# Patient Record
Sex: FEMALE | Race: WHITE | NOT HISPANIC OR LATINO
[De-identification: names, ages, dates, MRNs, and addresses within clinical notes are randomized per-mention and may not be internally consistent; named-entity substitution may affect disease eponyms.]

---

## 2022-02-22 ENCOUNTER — APPOINTMENT (OUTPATIENT)
Dept: OBGYN | Facility: CLINIC | Age: 27
End: 2022-02-22

## 2023-08-02 ENCOUNTER — NON-APPOINTMENT (OUTPATIENT)
Age: 28
End: 2023-08-02

## 2024-05-28 PROBLEM — Z00.00 ENCOUNTER FOR PREVENTIVE HEALTH EXAMINATION: Status: ACTIVE | Noted: 2024-05-28

## 2024-07-10 ENCOUNTER — INPATIENT (INPATIENT)
Facility: HOSPITAL | Age: 29
LOS: 1 days | Discharge: HOME | DRG: 951 | End: 2024-07-12
Attending: STUDENT IN AN ORGANIZED HEALTH CARE EDUCATION/TRAINING PROGRAM | Admitting: STUDENT IN AN ORGANIZED HEALTH CARE EDUCATION/TRAINING PROGRAM
Payer: COMMERCIAL

## 2024-07-10 VITALS — HEART RATE: 93 BPM | SYSTOLIC BLOOD PRESSURE: 130 MMHG | OXYGEN SATURATION: 99 % | DIASTOLIC BLOOD PRESSURE: 77 MMHG

## 2024-07-10 DIAGNOSIS — Z34.80 ENCOUNTER FOR SUPERVISION OF OTHER NORMAL PREGNANCY, UNSPECIFIED TRIMESTER: ICD-10-CM

## 2024-07-10 DIAGNOSIS — O26.899 OTHER SPECIFIED PREGNANCY RELATED CONDITIONS, UNSPECIFIED TRIMESTER: ICD-10-CM

## 2024-07-10 LAB
BASOPHILS # BLD AUTO: 0.03 K/UL — SIGNIFICANT CHANGE UP (ref 0–0.2)
BASOPHILS NFR BLD AUTO: 0.2 % — SIGNIFICANT CHANGE UP (ref 0–2)
EOSINOPHIL # BLD AUTO: 0.05 K/UL — SIGNIFICANT CHANGE UP (ref 0–0.5)
EOSINOPHIL NFR BLD AUTO: 0.4 % — SIGNIFICANT CHANGE UP (ref 0–6)
HCT VFR BLD CALC: 38.1 % — SIGNIFICANT CHANGE UP (ref 34.5–45)
HGB BLD-MCNC: 12.9 G/DL — SIGNIFICANT CHANGE UP (ref 11.5–15.5)
IMM GRANULOCYTES NFR BLD AUTO: 0.6 % — SIGNIFICANT CHANGE UP (ref 0–0.9)
LYMPHOCYTES # BLD AUTO: 1.25 K/UL — SIGNIFICANT CHANGE UP (ref 1–3.3)
LYMPHOCYTES # BLD AUTO: 10.4 % — LOW (ref 13–44)
MCHC RBC-ENTMCNC: 27.7 PG — SIGNIFICANT CHANGE UP (ref 27–34)
MCHC RBC-ENTMCNC: 33.9 GM/DL — SIGNIFICANT CHANGE UP (ref 32–36)
MCV RBC AUTO: 81.8 FL — SIGNIFICANT CHANGE UP (ref 80–100)
MONOCYTES # BLD AUTO: 0.99 K/UL — HIGH (ref 0–0.9)
MONOCYTES NFR BLD AUTO: 8.2 % — SIGNIFICANT CHANGE UP (ref 2–14)
NEUTROPHILS # BLD AUTO: 9.65 K/UL — HIGH (ref 1.8–7.4)
NEUTROPHILS NFR BLD AUTO: 80.2 % — HIGH (ref 43–77)
NRBC # BLD: 0 /100 WBCS — SIGNIFICANT CHANGE UP (ref 0–0)
PLATELET # BLD AUTO: 215 K/UL — SIGNIFICANT CHANGE UP (ref 150–400)
RBC # BLD: 4.66 M/UL — SIGNIFICANT CHANGE UP (ref 3.8–5.2)
RBC # FLD: 13.8 % — SIGNIFICANT CHANGE UP (ref 10.3–14.5)
WBC # BLD: 12.04 K/UL — HIGH (ref 3.8–10.5)
WBC # FLD AUTO: 12.04 K/UL — HIGH (ref 3.8–10.5)

## 2024-07-10 RX ORDER — TETANUS TOXOID, REDUCED DIPHTHERIA TOXOID AND ACELLULAR PERTUSSIS VACCINE, ADSORBED 5; 2.5; 8; 8; 2.5 [IU]/.5ML; [IU]/.5ML; UG/.5ML; UG/.5ML; UG/.5ML
0.5 SUSPENSION INTRAMUSCULAR ONCE
Refills: 0 | Status: DISCONTINUED | OUTPATIENT
Start: 2024-07-10 | End: 2024-07-12

## 2024-07-10 RX ORDER — DIBUCAINE 1 %
1 OINTMENT (GRAM) TOPICAL EVERY 6 HOURS
Refills: 0 | Status: DISCONTINUED | OUTPATIENT
Start: 2024-07-10 | End: 2024-07-12

## 2024-07-10 RX ORDER — OXYCODONE HYDROCHLORIDE 100 MG/5ML
5 SOLUTION ORAL
Refills: 0 | Status: DISCONTINUED | OUTPATIENT
Start: 2024-07-10 | End: 2024-07-12

## 2024-07-10 RX ORDER — OXYCODONE HYDROCHLORIDE 100 MG/5ML
5 SOLUTION ORAL ONCE
Refills: 0 | Status: DISCONTINUED | OUTPATIENT
Start: 2024-07-10 | End: 2024-07-12

## 2024-07-10 RX ORDER — PRENATAL VIT/IRON FUM/FOLIC AC 60 MG-1 MG
1 TABLET ORAL DAILY
Refills: 0 | Status: DISCONTINUED | OUTPATIENT
Start: 2024-07-10 | End: 2024-07-12

## 2024-07-10 RX ORDER — TRISODIUM CITRATE DIHYDRATE AND CITRIC ACID MONOHYDRATE 500; 334 MG/5ML; MG/5ML
15 SOLUTION ORAL EVERY 6 HOURS
Refills: 0 | Status: DISCONTINUED | OUTPATIENT
Start: 2024-07-10 | End: 2024-07-10

## 2024-07-10 RX ORDER — HYDROCORTISONE VALERATE 0.2 %
1 CREAM (GRAM) TOPICAL EVERY 6 HOURS
Refills: 0 | Status: DISCONTINUED | OUTPATIENT
Start: 2024-07-10 | End: 2024-07-12

## 2024-07-10 RX ORDER — OXYTOCIN 30 [USP'U]/500ML
4 INJECTION, SOLUTION INTRAVENOUS
Qty: 30 | Refills: 0 | Status: DISCONTINUED | OUTPATIENT
Start: 2024-07-10 | End: 2024-07-12

## 2024-07-10 RX ORDER — PRAMOXINE HCL 1 %
1 CREAM (GRAM) RECTAL EVERY 4 HOURS
Refills: 0 | Status: DISCONTINUED | OUTPATIENT
Start: 2024-07-10 | End: 2024-07-12

## 2024-07-10 RX ORDER — DEXTROSE MONOHYDRATE AND SODIUM CHLORIDE 5; .3 G/100ML; G/100ML
1000 INJECTION, SOLUTION INTRAVENOUS
Refills: 0 | Status: DISCONTINUED | OUTPATIENT
Start: 2024-07-10 | End: 2024-07-10

## 2024-07-10 RX ORDER — DIPHENHYDRAMINE HCL 12.5MG/5ML
25 ELIXIR ORAL EVERY 6 HOURS
Refills: 0 | Status: DISCONTINUED | OUTPATIENT
Start: 2024-07-10 | End: 2024-07-12

## 2024-07-10 RX ORDER — BENZOCAINE 15 %
1 LIQUID (ML) TOPICAL EVERY 6 HOURS
Refills: 0 | Status: DISCONTINUED | OUTPATIENT
Start: 2024-07-10 | End: 2024-07-12

## 2024-07-10 RX ORDER — SODIUM CHLORIDE 0.9 % (FLUSH) 0.9 %
3 SYRINGE (ML) INJECTION EVERY 8 HOURS
Refills: 0 | Status: DISCONTINUED | OUTPATIENT
Start: 2024-07-10 | End: 2024-07-12

## 2024-07-10 RX ORDER — KETOROLAC TROMETHAMINE 30 MG/ML
30 INJECTION, SOLUTION INTRAMUSCULAR ONCE
Refills: 0 | Status: DISCONTINUED | OUTPATIENT
Start: 2024-07-10 | End: 2024-07-10

## 2024-07-10 RX ORDER — OXYTOCIN 30 [USP'U]/500ML
41.67 INJECTION, SOLUTION INTRAVENOUS
Qty: 20 | Refills: 0 | Status: DISCONTINUED | OUTPATIENT
Start: 2024-07-10 | End: 2024-07-12

## 2024-07-10 RX ORDER — SIMETHICONE 40MG/0.6ML
80 SUSPENSION, DROPS(FINAL DOSAGE FORM)(ML) ORAL EVERY 4 HOURS
Refills: 0 | Status: DISCONTINUED | OUTPATIENT
Start: 2024-07-10 | End: 2024-07-12

## 2024-07-10 RX ORDER — HYDROCORTISONE ACETATE 1 %
1 OINTMENT (GRAM) RECTAL EVERY 4 HOURS
Refills: 0 | Status: DISCONTINUED | OUTPATIENT
Start: 2024-07-10 | End: 2024-07-12

## 2024-07-10 RX ORDER — ACETAMINOPHEN 325 MG
975 TABLET ORAL
Refills: 0 | Status: DISCONTINUED | OUTPATIENT
Start: 2024-07-10 | End: 2024-07-12

## 2024-07-10 RX ORDER — LANOLIN
1 WAX (GRAM) MISCELLANEOUS EVERY 6 HOURS
Refills: 0 | Status: DISCONTINUED | OUTPATIENT
Start: 2024-07-10 | End: 2024-07-12

## 2024-07-10 RX ORDER — OXYTOCIN 30 [USP'U]/500ML
333.33 INJECTION, SOLUTION INTRAVENOUS
Qty: 20 | Refills: 0 | Status: COMPLETED | OUTPATIENT
Start: 2024-07-10 | End: 2024-07-10

## 2024-07-10 RX ADMIN — Medication 3 MILLILITER(S): at 23:03

## 2024-07-10 RX ADMIN — KETOROLAC TROMETHAMINE 30 MILLIGRAM(S): 30 INJECTION, SOLUTION INTRAMUSCULAR at 19:30

## 2024-07-10 RX ADMIN — DEXTROSE MONOHYDRATE AND SODIUM CHLORIDE 125 MILLILITER(S): 5; .3 INJECTION, SOLUTION INTRAVENOUS at 14:33

## 2024-07-10 RX ADMIN — OXYTOCIN 1000 MILLIUNIT(S)/MIN: 30 INJECTION, SOLUTION INTRAVENOUS at 17:50

## 2024-07-10 RX ADMIN — OXYTOCIN 4 MILLIUNIT(S)/MIN: 30 INJECTION, SOLUTION INTRAVENOUS at 14:33

## 2024-07-10 NOTE — OB PROVIDER H&P - HISTORY OF PRESENT ILLNESS
PA Note:  29y  @40wks and 4 days gestation presenting from office where she was found to be 4cm dilated and have low-normal fluid. She reports irregular ctx's. She denies LOF or VB. PNC uncomplicated. +FM. GBS -. EFW 7#9 done by ultrasound today.    POBHx: Denies  PGYNHx: Denies fibroids, ovarian cysts, abnormal pap smears, STD's  PMHx: "silent heart murmur" per pt-doesn't follow with cards  Medications: PNV  Allergies: NKDA  PSHx: Denies  Social Hx: Denies etoh/tobacco/drug use. Denies anxiety/depression    Vital Signs Last 24 Hrs  T(C): 37.3 (10 Jul 2024 12:08), Max: 37.3 (10 Jul 2024 12:08)  T(F): 99.1 (10 Jul 2024 12:08), Max: 99.1 (10 Jul 2024 12:08)  HR: 85 (10 Jul 2024 12:37) (85 - 100)  BP: 136/83 (10 Jul 2024 12:15) (130/77 - 136/83)  BP(mean): --  RR: 18 (10 Jul 2024 12:08) (18 - 18)  SpO2: 97% (10 Jul 2024 12:37) (89% - 99%)    Parameters below as of 10 Jul 2024 12:08  Patient On (Oxygen Delivery Method): room air    General: NAD, A&Ox3  CV: RRR  Lungs: CTA b/l  Abdomen: Soft, NT, gravid    VE: 5/80/-3  EFM: 140/moderate variability/+accels/no decels  [place]: q4-6m

## 2024-07-10 NOTE — OB PROVIDER DELIVERY SUMMARY - NSPRESENTATIONA_OBGYN_ALL_OB
Problem: Infection  Goal: Will remain free from infection    Intervention: Implement standard precautions and perform hand washing before and after patient contact  Performed hand hygiene appropriately throughout shift, education provided       Problem: Pain  Goal: Alleviation of Pain or a reduction in pain to the patient's comfort goal    Intervention: Pain Management - Epidural/Spinal  Pt currently resting comfortably with epidural          Cephalic

## 2024-07-10 NOTE — OB PROVIDER DELIVERY SUMMARY - NSPROVIDERDELIVERYNOTE_OBGYN_ALL_OB_FT
liveborn female infant in OA position  Compound presentation w/R arm  Placenta delivered spontaneously and intact  Small L sulcal, first degree, b/l periurethral repaired with 2.0 and 3.0 vicryl rapide suture  Count correct x 2  EBL 350ml    MD Yovanny

## 2024-07-10 NOTE — OB PROVIDER H&P - NSLOWPPHRISK_OBGYN_A_OB
No previous uterine incision/[place] Pregnancy/Less than or equal to 4 previous vaginal births/No known bleeding disorder/No history of postpartum hemorrhage/No other PPH risks indicated

## 2024-07-10 NOTE — OB RN DELIVERY SUMMARY - NSSELHIDDEN_OBGYN_ALL_OB_FT
[NS_DeliveryAttending1_OBGYN_ALL_OB_FT:[id number]=],[NS_DeliveryRN_OBGYN_ALL_OB_FT:[id number]==]

## 2024-07-10 NOTE — OB PROVIDER H&P - NSHPPHYSICALEXAM_GEN_ALL_CORE
Vital Signs Last 24 Hrs  T(C): 37.3 (10 Jul 2024 12:08), Max: 37.3 (10 Jul 2024 12:08)  T(F): 99.1 (10 Jul 2024 12:08), Max: 99.1 (10 Jul 2024 12:08)  HR: 85 (10 Jul 2024 12:37) (85 - 100)  BP: 136/83 (10 Jul 2024 12:15) (130/77 - 136/83)  BP(mean): --  RR: 18 (10 Jul 2024 12:08) (18 - 18)  SpO2: 97% (10 Jul 2024 12:37) (89% - 99%)    Parameters below as of 10 Jul 2024 12:08  Patient On (Oxygen Delivery Method): room air    General: NAD, A&Ox3  CV: RRR  Lungs: CTA b/l  Abdomen: Soft, NT, gravid

## 2024-07-10 NOTE — OB RN DELIVERY SUMMARY - NS_SEPSISRSKCALC_OBGYN_ALL_OB_FT
EOS calculated successfully. EOS Risk Factor: 0.5/1000 live births ([facility] national incidence); GA=40w4d; Temp=99.1; ROM=4.467; GBS='Negative'; Antibiotics='No antibiotics or any antibiotics < 2 hrs prior to birth'

## 2024-07-10 NOTE — OB PROVIDER DELIVERY SUMMARY - NSSELHIDDEN_OBGYN_ALL_OB_FT
[NS_DeliveryAttending1_OBGYN_ALL_OB_FT:[id number]=],[NS_DeliveryRN_OBGYN_ALL_OB_FT:[id number]==]

## 2024-07-10 NOTE — OB PROVIDER H&P - ASSESSMENT
A/P:  29y  @40wks and 4 days gestation presenting from office in early labor with low/normal fluid. +FM. GBS -. EFW 3400g  -Admit to L&D  -Routine labs  -EFM/[place]  -NPO, IVF, Bicitra  -Augment with pitocin  -Anesthesia consult  -Anticipate   D/w Dr. Adrian Granger PA-C

## 2024-07-10 NOTE — CHART NOTE - NSCHARTNOTEFT_GEN_A_CORE
Ob attending note    Pt examined at bedside. AROM performed - clear. 6/80/-2. Cat 1 tracing. for epi/pit.     MD Yovanny
Ob attending note    Pt examined for cervical change. Noted early decels. 9.5/90/-2. c/w pit for augmentation, high fowlers, resuscitate prn with repositioning, IVF. Plan to start pushing at fully dilated.    MD Yovanny

## 2024-07-11 LAB — T PALLIDUM AB TITR SER: NEGATIVE — SIGNIFICANT CHANGE UP

## 2024-07-11 RX ADMIN — Medication 975 MILLIGRAM(S): at 12:45

## 2024-07-11 RX ADMIN — Medication 600 MILLIGRAM(S): at 20:45

## 2024-07-11 RX ADMIN — Medication 600 MILLIGRAM(S): at 21:30

## 2024-07-11 RX ADMIN — Medication 600 MILLIGRAM(S): at 16:20

## 2024-07-11 RX ADMIN — Medication 600 MILLIGRAM(S): at 09:50

## 2024-07-11 RX ADMIN — Medication 600 MILLIGRAM(S): at 15:28

## 2024-07-11 RX ADMIN — Medication 975 MILLIGRAM(S): at 23:23

## 2024-07-11 RX ADMIN — Medication 600 MILLIGRAM(S): at 03:30

## 2024-07-11 RX ADMIN — Medication 975 MILLIGRAM(S): at 17:42

## 2024-07-11 RX ADMIN — Medication 975 MILLIGRAM(S): at 18:33

## 2024-07-11 RX ADMIN — Medication 975 MILLIGRAM(S): at 00:52

## 2024-07-11 RX ADMIN — Medication 975 MILLIGRAM(S): at 06:45

## 2024-07-11 RX ADMIN — Medication 600 MILLIGRAM(S): at 09:03

## 2024-07-11 RX ADMIN — Medication 975 MILLIGRAM(S): at 05:46

## 2024-07-11 RX ADMIN — Medication 975 MILLIGRAM(S): at 00:05

## 2024-07-11 RX ADMIN — Medication 1 TABLET(S): at 11:55

## 2024-07-11 RX ADMIN — Medication 975 MILLIGRAM(S): at 11:55

## 2024-07-11 RX ADMIN — Medication 600 MILLIGRAM(S): at 02:34

## 2024-07-11 NOTE — PROGRESS NOTE ADULT - SUBJECTIVE AND OBJECTIVE BOX
OB PA Progress Note:  PPD#1    29yoF P1 PPD#1 s/p . Patient feels well. Pain is well controlled. She is tolerating a regular diet and passing flatus. She is voiding freely and ambulating well without difficulty. Denies CP/SOB. Denies lightheadedness/dizziness. Denies N/V.    Vital Signs Last 24 Hrs  T(C): 36.6 (2024 05:54), Max: 37.3 (10 Jul 2024 12:08)  T(F): 97.8 (2024 05:54), Max: 99.1 (10 Jul 2024 12:08)  HR: 71 (2024 05:54) (66 - 110)  BP: 129/76 (2024 05:54) (110/65 - 187/73)  RR: 18 (2024 05:54) (18 - 20)  SpO2: 96% (2024 05:54) (71% - 100%)    MEDICATIONS  (STANDING):  acetaminophen     Tablet .. 975 milliGRAM(s) Oral <User Schedule>  diphtheria/tetanus/pertussis (acellular) Vaccine (Adacel) 0.5 milliLiter(s) IntraMuscular once  ibuprofen  Tablet. 600 milliGRAM(s) Oral every 6 hours  oxytocin Infusion 41.667 milliUNIT(s)/Min (125 mL/Hr) IV Continuous <Continuous>  oxytocin Infusion. 4 milliUNIT(s)/Min (4 mL/Hr) IV Continuous <Continuous>  prenatal multivitamin 1 Tablet(s) Oral daily  sodium chloride 0.9% lock flush 3 milliLiter(s) IV Push every 8 hours    Labs:  Blood type: A Positive  Rubella IgG: Immune   RPR: Negative                       12.9   12.04<H> >-----------< 215    ( 0710 @ 13:22 )             38.1    General: NAD  Abdomen: soft, non-tender, non-distended, fundus firm and below the umbillicus   Vaginal: Lochia wnl  Extremities: No erythema/edema. No calf tenderness

## 2024-07-12 ENCOUNTER — TRANSCRIPTION ENCOUNTER (OUTPATIENT)
Age: 29
End: 2024-07-12

## 2024-07-12 VITALS
HEART RATE: 66 BPM | RESPIRATION RATE: 18 BRPM | OXYGEN SATURATION: 99 % | DIASTOLIC BLOOD PRESSURE: 74 MMHG | TEMPERATURE: 98 F | SYSTOLIC BLOOD PRESSURE: 116 MMHG

## 2024-07-12 RX ORDER — ACETAMINOPHEN 325 MG
3 TABLET ORAL
Qty: 0 | Refills: 0 | DISCHARGE
Start: 2024-07-12

## 2024-07-12 RX ORDER — PRENATAL VIT/IRON FUM/FOLIC AC 60 MG-1 MG
0 TABLET ORAL
Refills: 0 | DISCHARGE

## 2024-07-12 RX ORDER — PRENATAL VIT/IRON FUM/FOLIC AC 60 MG-1 MG
1 TABLET ORAL
Qty: 0 | Refills: 0 | DISCHARGE
Start: 2024-07-12

## 2024-07-12 RX ADMIN — Medication 975 MILLIGRAM(S): at 11:40

## 2024-07-12 RX ADMIN — Medication 975 MILLIGRAM(S): at 00:00

## 2024-07-12 RX ADMIN — Medication 975 MILLIGRAM(S): at 05:13

## 2024-07-12 RX ADMIN — Medication 600 MILLIGRAM(S): at 09:18

## 2024-07-12 RX ADMIN — Medication 975 MILLIGRAM(S): at 06:47

## 2024-07-12 RX ADMIN — Medication 1 TABLET(S): at 11:40

## 2024-07-12 RX ADMIN — Medication 975 MILLIGRAM(S): at 12:47

## 2024-07-12 RX ADMIN — Medication 600 MILLIGRAM(S): at 10:00

## 2024-07-12 NOTE — DISCHARGE NOTE OB - HOSPITAL COURSE
see hospital record for detailed account   pt ambulating, felicia regular diet and ready for dc home

## 2024-07-12 NOTE — DISCHARGE NOTE OB - MEDICATION SUMMARY - MEDICATIONS TO TAKE
I will START or STAY ON the medications listed below when I get home from the hospital:     mg oral tablet  -- 1 tab(s) by mouth every 6 hours  -- Indication: For Supervision of other normal pregnancy, antepartum    acetaminophen 325 mg oral tablet  -- 3 tab(s) by mouth 3 times a day  -- Indication: For Supervision of other normal pregnancy, antepartum    Prenatal Multivitamins with Folic Acid 1 mg oral tablet  -- 1 tab(s) by mouth once a day  -- Indication: For Supervision of other normal pregnancy, antepartum

## 2024-07-12 NOTE — DISCHARGE NOTE OB - CARE PLAN
1 Principal Discharge DX:	Vaginal delivery  Assessment and plan of treatment:	pelvic rest, limit activity and regular diet

## 2024-07-12 NOTE — DISCHARGE NOTE OB - PATIENT PORTAL LINK FT
You can access the FollowMyHealth Patient Portal offered by [facility] by registering at the following website: [email or web address]. By joining [employer]’s FollowMyHealth portal, you will also be able to view your health information using other applications (apps) compatible with our system.

## 2024-07-12 NOTE — PROGRESS NOTE ADULT - ASSESSMENT
pt seen and eval by me   doing well   for dc home   M. Oppenheim, MD
A/P: 28yo P1 PPD#1 s/p . Pt stable and doing well post-partum.   - Pain well controlled, continue current pain regimen  - Increase ambulation, SCDs when not ambulating  - Continue regular diet  - Continue post partum care     Rachel Rodriguez PA-C

## 2024-07-12 NOTE — DISCHARGE NOTE OB - CARE PROVIDER_API CALL
[Time Spent: ___ minutes] : I have spent [unfilled] minutes of time on the encounter. Fabiola Campbell  Obstetrics and Gynecology  [address] [place] [id number]  Phone: [phone]  Fax: [phone]  Follow Up Time: